# Patient Record
Sex: FEMALE | Race: WHITE | NOT HISPANIC OR LATINO | Employment: FULL TIME | URBAN - NONMETROPOLITAN AREA
[De-identification: names, ages, dates, MRNs, and addresses within clinical notes are randomized per-mention and may not be internally consistent; named-entity substitution may affect disease eponyms.]

---

## 2023-07-19 LAB
CALCULI COMPOSITION: NORMAL
CALCULI DESCRIPTION: NORMAL
CALCULI MASS: NORMAL
CALCULI NUMBER-DATA CONVERSION: NORMAL
CALCULI SIZE-DATA CONVERSION: NORMAL

## 2023-07-24 LAB
CALCULI COMPOSITION: NORMAL
CALCULI DESCRIPTION: NORMAL
CALCULI MASS: 6968 MG

## 2023-12-20 ENCOUNTER — HOSPITAL ENCOUNTER (EMERGENCY)
Facility: HOSPITAL | Age: 33
Discharge: HOME | End: 2023-12-20
Payer: COMMERCIAL

## 2023-12-20 VITALS
HEART RATE: 84 BPM | TEMPERATURE: 99.2 F | HEIGHT: 68 IN | DIASTOLIC BLOOD PRESSURE: 70 MMHG | BODY MASS INDEX: 20.46 KG/M2 | OXYGEN SATURATION: 99 % | WEIGHT: 135 LBS | RESPIRATION RATE: 17 BRPM | SYSTOLIC BLOOD PRESSURE: 106 MMHG

## 2023-12-20 DIAGNOSIS — J10.1 INFLUENZA A: Primary | ICD-10-CM

## 2023-12-20 DIAGNOSIS — U07.1 COVID: ICD-10-CM

## 2023-12-20 LAB
FLUAV RNA RESP QL NAA+PROBE: DETECTED
FLUBV RNA RESP QL NAA+PROBE: NOT DETECTED
RSV RNA RESP QL NAA+PROBE: NOT DETECTED
S PYO DNA THROAT QL NAA+PROBE: NOT DETECTED
SARS-COV-2 RNA RESP QL NAA+PROBE: DETECTED

## 2023-12-20 PROCEDURE — 87637 SARSCOV2&INF A&B&RSV AMP PRB: CPT

## 2023-12-20 PROCEDURE — 87651 STREP A DNA AMP PROBE: CPT

## 2023-12-20 PROCEDURE — 99283 EMERGENCY DEPT VISIT LOW MDM: CPT

## 2023-12-20 ASSESSMENT — PAIN DESCRIPTION - PAIN TYPE: TYPE: ACUTE PAIN

## 2023-12-20 ASSESSMENT — COLUMBIA-SUICIDE SEVERITY RATING SCALE - C-SSRS
2. HAVE YOU ACTUALLY HAD ANY THOUGHTS OF KILLING YOURSELF?: NO
1. IN THE PAST MONTH, HAVE YOU WISHED YOU WERE DEAD OR WISHED YOU COULD GO TO SLEEP AND NOT WAKE UP?: NO
6. HAVE YOU EVER DONE ANYTHING, STARTED TO DO ANYTHING, OR PREPARED TO DO ANYTHING TO END YOUR LIFE?: NO

## 2023-12-20 ASSESSMENT — PAIN SCALES - GENERAL: PAINLEVEL_OUTOF10: 5 - MODERATE PAIN

## 2023-12-20 ASSESSMENT — PAIN DESCRIPTION - LOCATION: LOCATION: THROAT

## 2023-12-20 ASSESSMENT — PAIN - FUNCTIONAL ASSESSMENT: PAIN_FUNCTIONAL_ASSESSMENT: 0-10

## 2023-12-20 NOTE — ED PROVIDER NOTES
Limitations to history: None  Independent Historians: Family  External Records Reviewed: HIE, OARRS, outpatient notes, inpatient notes, paper charts if needed    History of Present Illness:  Patient is a 33-year-old female presents to ED chief complaint of flulike symptoms, chills for the past 2 days.  Patient also reports fatigue and sore throat, myalgias.  Reports she has no known medical history takes no known medications has no known allergies.  Reports she is a current every day smoker.  Patient denies any chest pain, shortness of breath, nausea, vomiting and/or diarrhea.  Patient is alert and oriented x 3 upon examination, no apparent distress.      Denies HA, C/P, SOB, ABD pain, Nausea, Vomiting, Diarrhea, Weakness, Dizziness, Fever.    PMFSH:   As per HPI, otherwise nurses notes reviewed in EMR    Physical Exam:  Appearance: Alert, oriented x3, supine on exam table with head elevated, cooperative, in no acute distress. Well nourished & well hydrated.      Skin: Intact, dry skin, no lesions, rash, petechiae or purpura.     Eyes: PERRLA, EOMs intact, Conjunctiva pink with no redness or exudates. No scleral icterus.     Ears: Hearing grossly intact.      Nose: Nares patent, no epistaxis.     Mouth: Tonsils are mildly swollen, tonsils without exudate and/or evidence of abscess formation.  Dentition without concerning abnormalities. no obstruction of posterior pharynx.     Neck: Supple, without meningismus. Trachea at midline.     Pulmonary: Clear bilaterally with good chest wall excursion. No rales, rhonchi or wheezing. No accessory muscle use or stridor. Talking in full sentences.     Cardiac: Normal S1, S2 without murmur, rub, gallop or extrasystole.     Abdomen: Soft, nontender to light and deep palpation to all quadrants, normoactive bowel sounds.  No palpable organomegaly.  No rebound or guarding.     Genitourinary: Physical exam deferred.     Musculoskeletal: Normal gait. Full range of motion to all  extremities. Rest of the exam reveals no pain on palpation, instability, or deformity. Pulses full and equal. No cyanosis or clubbing. capillary refill <2 seconds to all examined digits.     Neurological:  Cranial nerves II through XII are grossly intact, normal sensation, no weakness, no focal findings identified.      Psychiatric: Appropriate mood and affect.    Labs Reviewed   SARS-COV-2 AND INFLUENZA A/B PCR - Abnormal       Result Value    Flu A Result Detected (*)     Flu B Result Not Detected      Coronavirus 2019, PCR Detected (*)     Narrative:     This assay has received FDA Emergency Use Authorization (EUA) and  is only authorized for the duration of time that circumstances exist to justify the authorization of the emergency use of in vitro diagnostic tests for the detection of SARS-CoV-2 virus and/or diagnosis of COVID-19 infection under section 564(b)(1) of the Act, 21 U.S.C. 360bbb-3(b)(1). Testing for SARS-CoV-2 is only recommended for patients who meet current clinical and/or epidemiological criteria as defined by federal, state, or local public health directives. This assay is an in vitro diagnostic nucleic acid amplification test for the qualitative detection of SARS-CoV-2, Influenza A, and Influenza B from nasopharyngeal specimens and has been validated for use at [facility]. Negative results do not preclude COVID-19 infections or Influenza A/B infections, and should not be used as the sole basis for diagnosis, treatment, or other management decisions. If Influenza A/B and RSV PCR results are negative, testing for Parainfluenza virus, Adenovirus and Metapneumovirus is routinely performed for [facility] [place] oncology and intensive care inpatients, and is available on other patients by placing an add-on request.    GROUP A STREPTOCOCCUS, PCR - Normal    Group A Strep PCR Not Detected     RSV PCR - Normal    RSV PCR Not Detected      Narrative:     This assay is an FDA-cleared,  in vitro diagnostic nucleic acid amplification test for the detection of RSV from nasopharyngeal specimens, and has been validated for use at [facility]. Negative results do not preclude RSV infections, and should not be used as the sole basis for diagnosis, treatment, or other management decisions. If Influenza A/B and RSV PCR results are negative, testing for Parainfluenza virus, Adenovirus and Metapneumovirus is routinely performed for pediatric oncology and intensive care inpatients at [facility], and is available on other patients by placing an add-on request.          No orders to display                Repeat Evaluation below    Summary:  Medical Decision Making:   Patient presented as described in HPI. Patient case including ROS, PE, and treatment and plan discussed with ED attending if attached as cosigner. Due to patients presentation orders completed include as documented.  Patient evaluated for complaints of flulike symptoms, myalgias, sore throat.  Patient was found to be influenza A and COVID-positive while in ED.  Patient was found to be strep negative.  Patient's vital signs were stable, patient nontachycardic, nonhypoxic.  Patient educated on rest, increase hydration and take Tylenol and/or Motrin as needed for analgesia.  Patient aware that if symptoms worsen or she develops chest pain, and/or shortness of breath to return to ED for further evaluation and treatment, otherwise follow-up primary care provider within 1 to 2 weeks.  Patient aware of all case findings, aware of plan of care.  Patient was advised to follow up with PCP or recommended provider in 2-3 days for another evaluation and exam. I advised patient/guardian to return or go to closest emergency room immediately if symptoms change, get worse, new symptoms develop prior to follow up. If there is no improvement in symptoms in the next 24 hours they are advised to return for further evaluation and exam. I also explained  the plan and treatment course. Patient/guardian is in agreement with plan, treatment course, and follow up and states verbally that they will comply.    Tests/Medications/Escalations of Care considered but not given:    Patient care discussed with: N/A  Social Determinants affecting care: N/A    Final diagnosis and disposition as documented in impression    Homegoing. I discussed the differential; results and discharge plan with the patient and/or family/friend/caregiver if present.  I emphasized the importance of follow-up with the physician I referred them to in the timeframe recommended.  I explained reasons for the patient to return to the Emergency Department. They agreed that if they feel their condition is worsening or if they have any other concern they should call 911 immediately for further assistance. I gave the patient an opportunity to ask all questions they had and answered all of them accordingly. They understand return precautions and discharge instructions. The patient and/or family/friend/caregiver expressed understanding verbally and that they would comply.       Disposition:  Discharge       This note has been transcribed using voice recognition and may contain grammatical errors, misplaced words, incorrect words, incorrect phrases or other errors.     An Novak, APRN-CNP  12/20/23 1138